# Patient Record
(demographics unavailable — no encounter records)

---

## 2024-10-29 NOTE — PHYSICAL EXAM
[Able to Communicate] : able to communicate [Well Developed] : well developed [Well Nourished] : well nourished [NL (40)] : plantar flexion 40 degrees [NL 30)] : inversion 30 degrees [NL (20)] : eversion 20 degrees [5___] : eversion 5[unfilled]/5 [2nd] : 2nd [3rd] : 3rd [4th] : 4th [5th] : 5th [Right] : right foot [Left] : left tibia/fibula [There are no fractures, subluxations or dislocations. No significant abnormalities are seen] : There are no fractures, subluxations or dislocations. No significant abnormalities are seen [] : no erythema [FreeTextEntry3] : fungal infection nailplate big toe

## 2024-10-29 NOTE — HISTORY OF PRESENT ILLNESS
[8] : 8 [Sharp] : sharp [Intermittent] : intermittent [Standing] : standing [Walking/activity] : walking/activity [Lying in bed] : lying in bed [Part time] : Work status: part time [de-identified] : 10/29/24  Return visit for this 76 year old male, hx of IDDM, c/o bilateral foot and leg pain, rt >> lt, mostly at night x last few months duration. Denies N/T.Notices his rt big toe goes into spasm and hyperextends.. [] : no [FreeTextEntry1] : bilateral feet [FreeTextEntry7] : toward knees [de-identified] : none [de-identified] : [employer]

## 2024-10-29 NOTE — PLAN
[TextEntry] : The patient was advised of the diagnosis. The natural history of the pathology was explained in full to the patient in layman's terms. All questions were answered. The risks and benefits of surgical and non-surgical treatment alternatives were explained in full to the patient.  Will try Flexeril prn at night.  Pt will speak with his endocrinologist